# Patient Record
Sex: MALE | Race: OTHER
[De-identification: names, ages, dates, MRNs, and addresses within clinical notes are randomized per-mention and may not be internally consistent; named-entity substitution may affect disease eponyms.]

---

## 2022-04-25 PROBLEM — Z00.00 ENCOUNTER FOR PREVENTIVE HEALTH EXAMINATION: Status: ACTIVE | Noted: 2022-04-25

## 2022-05-27 ENCOUNTER — APPOINTMENT (OUTPATIENT)
Dept: ORTHOPEDIC SURGERY | Facility: CLINIC | Age: 42
End: 2022-05-27
Payer: COMMERCIAL

## 2022-05-27 VITALS — WEIGHT: 150 LBS | HEIGHT: 64 IN | BODY MASS INDEX: 25.61 KG/M2

## 2022-05-27 DIAGNOSIS — M51.26 OTHER INTERVERTEBRAL DISC DISPLACEMENT, LUMBAR REGION: ICD-10-CM

## 2022-05-27 DIAGNOSIS — E78.00 PURE HYPERCHOLESTEROLEMIA, UNSPECIFIED: ICD-10-CM

## 2022-05-27 DIAGNOSIS — M51.9 UNSPECIFIED THORACIC, THORACOLUMBAR AND LUMBOSACRAL INTERVERTEBRAL DISC DISORDER: ICD-10-CM

## 2022-05-27 DIAGNOSIS — M54.16 RADICULOPATHY, LUMBAR REGION: ICD-10-CM

## 2022-05-27 PROCEDURE — 99213 OFFICE O/P EST LOW 20 MIN: CPT

## 2022-05-27 RX ORDER — ROSUVASTATIN CALCIUM 5 MG/1
TABLET, FILM COATED ORAL
Refills: 0 | Status: ACTIVE | COMMUNITY

## 2022-05-27 NOTE — HISTORY OF PRESENT ILLNESS
[Lower back] : lower back [0] : 0 [Full time] : Work status: full time [de-identified] : 3/28/22: 40 y/o LHD male c/o low back pain for the past 4-5 months. No specific injury. Describes left-sided low back pain that radiates to the left leg with numbness. Worse with prolonged standing. He has been using ice with some relief. He saw his PCP and neuro, who ordered MRI and EMG (patient states unremarkable). Denies history of prior injury. No pain management/chiro/acupuncture\par \par PMHX: hyperlipidemia, denies CA history\par \par No loss of bb control\par \par OccHx:  - working currently\par \par MRI L spine: L3-4, L4-5 disc herniation\par \par X-rays today:\par L spine 4v - mild spondylosis\par Pelvis - negative\par \par 5/27/22: Here for follow up - plan at last was "reviewed the case and the imaging with him - left side NF DISC AT l4-5 IS THE LIKELY CULPRIT - DISCUSSED THE TX OPTIONS - WOULD REC mdp/GABAPENTIN/pt - IF THAT NOT WORKING THEN LIKELY l4-5 monet -SURGERY IF THE CONSERVATIVE TX FAILS" - overall symptoms are improved with MDP/gabapentin - has been going to PT - now only mild numbness [] : Post Surgical Visit: no [[email or web address]] : here for follow up for low back\par pain is a lot better after physical therapy

## 2022-05-27 NOTE — DISCUSSION/SUMMARY
[de-identified] : Will continue HEP - discussed options moving forward - feels he does not need pain management at this time - will wean off of gabapentin - fu PRN\par \par Progress note completed by Janie Guerin PA-C.

## 2023-09-12 ENCOUNTER — NON-APPOINTMENT (OUTPATIENT)
Age: 43
End: 2023-09-12